# Patient Record
(demographics unavailable — no encounter records)

---

## 2025-04-28 NOTE — HISTORY OF PRESENT ILLNESS
[FreeTextEntry1] : ESRD on HD since April 2024. Prior to set up for dialysis, underwent US which demonstrated Right renal mass. No hematuria, flank pain. Oct 2023 US at [facility]: 3.6 cm right renal mass April 2024 US at [facility]: 3.7 cm right renal mass.  Has not had imaging since then.  US images reviewed with the patient. Mass appears solid with vascular flow.

## 2025-04-28 NOTE — HISTORY OF PRESENT ILLNESS
[FreeTextEntry1] : ESRD on HD since April 2024. Prior to set up for dialysis, underwent US which demonstrated Right renal mass. No hematuria, flank pain. Oct 2023 US at [facility]: 3.6 cm right renal mass April 2024 US at [facility]: 3.7 cm right renal mass.  Has not had imaging since then.  US images reviewed with the patient. Mass appears solid with vascular flow.

## 2025-04-28 NOTE — ASSESSMENT
[FreeTextEntry1] : MRI abdomen w/wo IV contrast ordered. Will call with results and recommendation of possible surgery. All questions answered.